# Patient Record
Sex: MALE | Race: ASIAN | NOT HISPANIC OR LATINO | URBAN - METROPOLITAN AREA
[De-identification: names, ages, dates, MRNs, and addresses within clinical notes are randomized per-mention and may not be internally consistent; named-entity substitution may affect disease eponyms.]

---

## 2018-12-20 ENCOUNTER — EMERGENCY (EMERGENCY)
Facility: HOSPITAL | Age: 23
LOS: 0 days | Discharge: HOME | End: 2018-12-20
Admitting: EMERGENCY MEDICAL TECHNICIAN, BASIC

## 2018-12-20 VITALS
DIASTOLIC BLOOD PRESSURE: 85 MMHG | HEART RATE: 80 BPM | SYSTOLIC BLOOD PRESSURE: 138 MMHG | TEMPERATURE: 97 F | OXYGEN SATURATION: 100 % | RESPIRATION RATE: 20 BRPM

## 2018-12-20 DIAGNOSIS — S92.344A NONDISPLACED FRACTURE OF FOURTH METATARSAL BONE, RIGHT FOOT, INITIAL ENCOUNTER FOR CLOSED FRACTURE: ICD-10-CM

## 2018-12-20 DIAGNOSIS — S92.354A NONDISPLACED FRACTURE OF FIFTH METATARSAL BONE, RIGHT FOOT, INITIAL ENCOUNTER FOR CLOSED FRACTURE: ICD-10-CM
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
## 2018-12-20 DIAGNOSIS — Z23 ENCOUNTER FOR IMMUNIZATION: ICD-10-CM
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
## 2018-12-20 DIAGNOSIS — M79.673 PAIN IN UNSPECIFIED FOOT: ICD-10-CM

## 2018-12-20 RX ORDER — TETANUS TOXOID, REDUCED DIPHTHERIA TOXOID AND ACELLULAR PERTUSSIS VACCINE, ADSORBED 5; 2.5; 8; 8; 2.5 [IU]/.5ML; [IU]/.5ML; UG/.5ML; UG/.5ML; UG/.5ML
0.5 SUSPENSION INTRAMUSCULAR ONCE
Qty: 0 | Refills: 0 | Status: COMPLETED | OUTPATIENT
Start: 2018-12-20 | End: 2018-12-20

## 2018-12-20 RX ADMIN — TETANUS TOXOID, REDUCED DIPHTHERIA TOXOID AND ACELLULAR PERTUSSIS VACCINE, ADSORBED 0.5 MILLILITER(S): 5; 2.5; 8; 8; 2.5 SUSPENSION INTRAMUSCULAR at 12:13

## 2018-12-20 NOTE — ED PROVIDER NOTE - NS ED ROS FT
CONSTITUTIONAL: (-) fevers, (-) chills  NECK: (-) neck pain, (-) neck stiffness  CARDIOVASCULAR: (-) chest pain, (-) edema  RESPIRATORY: (-) cough, (-) sputum, (-) shortness of breath  GI: (-) nausea, (-) vomiting, (-) abdominal pain  MSK: see HPI, (-) back pain, (-) hip pain, (-) knee pain, (-) gait difficulty  INTEGUMENTARY: (-) rashes, (-) pallor  NEURO: (-) headache, (-) dizziness, (-) lightheadedness, (-) syncope, (-) head injury, (-) LOC

## 2018-12-20 NOTE — ED PROVIDER NOTE - CARE PROVIDER_API CALL
Galen Alfonso (MD), Orthopaedic Surgery  [facility] [place] [address]  Phone: [phone]  Fax: [phone]

## 2018-12-20 NOTE — ED PROVIDER NOTE - PHYSICAL EXAMINATION
GENERAL: Well-developed, well-nourished, in no acute distress.  HEENT: Sclera clear. Conjunctiva non-injected. Mucous membranes moist.  NECK: supple, w FROM. No cervical spinous tenderness, step offs, or deformity.  CARDIOVASCULAR: Regular rate and rhythm, S1 and S2 present. No murmurs, rubs, or gallops. 2+ radial and DP pulses bilaterally. No chest wall tenderness.  RESPIRATORY: Normal effort. Clear to auscultation bilaterally without wheezes, rales, or rhonchi.  MSK: +superficial abrasions to dorsal-medial aspect of right foot. +ttp over lateral aspect of midfoot w mild overlying swelling. no obvious deformity, no ecchymosis or crepitus. FROM at the ankle, MTPs, and DIPs w/o pain. No malleolar tenderness. No calcaneal ttp. No midline thoracic or lumbar spinous tenderness, step offs, or deformity. Pelvis is stable. Knees w FROM without pain.  GI: Abdomen soft and non-distended. Nontender throughout. Negative Taveras Klein's and [place]'s.  INTEGUMENTARY: Warm, dry, no pallor or rashes. Capillary refill <2 seconds.  NEURO: A&Ox3. Speech clear.
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
## 2018-12-20 NOTE — ED PROVIDER NOTE - PROGRESS NOTE DETAILS
patient seen and evaluated by me. foot xrays and tdap ordered. patient refuses pain medications at this time.

## 2018-12-20 NOTE — ED PROVIDER NOTE - MEDICAL DECISION MAKING DETAILS
pt has 4th and 5th non-displaced metatarsal fx,  phone used for history taking as well as upon discharge, pt meño pt's uncle informed that pt must belinda p splin ton and use crutches until see's orthopedist, pt simon f/u and amendable emmanuel

## 2019-01-01 NOTE — ED PROVIDER NOTE - NS ED MD DISPO DISCHARGE
Home I will SWITCH the dose or number of times a day I take the medications listed below when I get home from the hospital:  None

## 2021-04-27 PROBLEM — Z00.00 ENCOUNTER FOR PREVENTIVE HEALTH EXAMINATION: Status: ACTIVE | Noted: 2021-04-27

## 2021-05-03 ENCOUNTER — APPOINTMENT (OUTPATIENT)
Dept: COLORECTAL SURGERY | Facility: CLINIC | Age: 26
End: 2021-05-03
Payer: COMMERCIAL

## 2021-05-03 VITALS
DIASTOLIC BLOOD PRESSURE: 81 MMHG | TEMPERATURE: 98.2 F | HEIGHT: 66.5 IN | BODY MASS INDEX: 22.23 KG/M2 | SYSTOLIC BLOOD PRESSURE: 147 MMHG | HEART RATE: 80 BPM | WEIGHT: 140 LBS

## 2021-05-03 DIAGNOSIS — Z72.3 LACK OF PHYSICAL EXERCISE: ICD-10-CM

## 2021-05-03 DIAGNOSIS — Z01.818 ENCOUNTER FOR OTHER PREPROCEDURAL EXAMINATION: ICD-10-CM

## 2021-05-03 DIAGNOSIS — Z78.9 OTHER SPECIFIED HEALTH STATUS: ICD-10-CM

## 2021-05-03 PROCEDURE — 46221 LIGATION OF HEMORRHOID(S): CPT

## 2021-05-03 PROCEDURE — 99202 OFFICE O/P NEW SF 15 MIN: CPT | Mod: 25

## 2021-05-03 PROCEDURE — 99072 ADDL SUPL MATRL&STAF TM PHE: CPT

## 2021-05-03 RX ORDER — ACETAMINOPHEN, GUAIFENESIN, PHENYLEPHRINE HCL 650; 400; 10 MG/20ML; MG/20ML; MG/20ML
LIQUID ORAL
Refills: 0 | Status: ACTIVE | COMMUNITY

## 2021-05-03 RX ORDER — LINACLOTIDE 72 UG/1
CAPSULE, GELATIN COATED ORAL
Refills: 0 | Status: ACTIVE | COMMUNITY

## 2021-05-03 RX ORDER — LORATADINE 10 MG
17 TABLET,DISINTEGRATING ORAL
Refills: 0 | Status: ACTIVE | COMMUNITY

## 2021-05-03 RX ORDER — HYDROCORTISONE 25 MG/G
CREAM TOPICAL
Refills: 0 | Status: ACTIVE | COMMUNITY

## 2021-05-03 NOTE — ASSESSMENT
[FreeTextEntry1] : I had a detailed discussion with the patient regarding optimization of bowel movements with increasing daily fiber and water intake. Both synthetic and dietary fiber recommendations were reviewed. I had strongly counseled the patient regarding the need for increasing water to help improve  bowel function.\par \par I discussed with the patient that improving  bowel function will alleviate  hemorrhoidal symptoms.\par \par Advised return in 4-6 weeks if symptoms are persistent. The potential need for an internal excisional hemorrhoidectomy was outlined.  The risks and befits of the treatment plan were reviewed and outlined with the patient. The patient understands the associated risks of the involved treatment and wishes to proceed. All questions were answered and explained.\par \par \par

## 2021-05-03 NOTE — PHYSICAL EXAM
[Excoriation] : no perianal excoriation [Normal] : was normal [None] : there was no rectal mass  [FreeTextEntry1] : A lighted anoscope was passed into the anal canal and the entire anal mucosal surface was inspected..  The findings revealed moderate/large  internal hemorrhoids. No masses or lesions were identified.\par \par The risks and benefits of rubber band ligation were discussed with the patient including but not limited to bleeding, pain, infection, and the need for future procedures. The anoscope was placed and rubber band ligation was performed of the internal hemorrhoids - leftlaterla and RPQ with good result. The patient tolerated the procedure well. Appropriate postprocedure instructions were given to the patient.\par \par \par

## 2021-05-03 NOTE — HISTORY OF PRESENT ILLNESS
[FreeTextEntry1] : 26 yo M [place]/English speaking presents w/ brother for evaluation of hemorrhoids, referred by GI Dr. ?Ing\par \par hx of chronic constipation, on Linzess once daily for the last year\par c/o worsening hemorrhoids since Summer 2020, w/ pain while sitting and BRBPR s/p RBL x 6 w/ GI w/o noticeable improvement. Last performed 1 month ago\par \par Pt continues to have BRB drip into toilet bowl and pain\par + prolapsing tissue w/ pain that reduces when he stands\par + burning after BM which is improved w/ applying topical hemorrhoid cream\par BH: twice daily, stooling improved since starting Linzess however still occasionally strains\par Also takes Miralax once daily and occasionally docusate once daily\par Admits to limited intake of vegetables and reports he has limited access to grocery stores as he works 6 days a week w/ long hours\par Drinks 2 L water daily\par \par Last colonoscopy within the last year, hemorrhoids otherwise normal as per patient\par Denies FMH colorectal CA\par Denies ASA/NSAIDs in last 7 days\par

## 2021-06-14 ENCOUNTER — APPOINTMENT (OUTPATIENT)
Dept: COLORECTAL SURGERY | Facility: CLINIC | Age: 26
End: 2021-06-14
Payer: COMMERCIAL

## 2021-06-14 VITALS
TEMPERATURE: 97.3 F | WEIGHT: 139 LBS | HEIGHT: 66.5 IN | HEART RATE: 84 BPM | DIASTOLIC BLOOD PRESSURE: 77 MMHG | SYSTOLIC BLOOD PRESSURE: 145 MMHG | BODY MASS INDEX: 22.08 KG/M2

## 2021-06-14 DIAGNOSIS — K64.8 OTHER HEMORRHOIDS: ICD-10-CM

## 2021-06-14 PROCEDURE — 46221 LIGATION OF HEMORRHOID(S): CPT

## 2021-06-14 PROCEDURE — 99072 ADDL SUPL MATRL&STAF TM PHE: CPT

## 2021-06-14 PROCEDURE — 99213 OFFICE O/P EST LOW 20 MIN: CPT | Mod: 25

## 2021-06-14 NOTE — REASON FOR VISIT
[Follow-Up: _____] : a [unfilled] follow-up visit [[employer] ] : provided by [employer]   [FreeTextEntry1] : [id number] [FreeTextEntry3] : [place]  [TWNoteComboBox1] : Other

## 2021-06-14 NOTE — PHYSICAL EXAM
[Excoriation] : no perianal excoriation [Normal] : was normal [None] : there was no rectal mass  [FreeTextEntry1] : Medical assistant was present for the entire exam.\par \par Anoscopy was performed for evaluation of the patients rectal bleeding  history .\par The risks, benefits and alternatives were reviewed.\par \par A lighted anoscope was passed into the anal canal and the entire anal mucosal surface was inspected..  \par The findings revealed moderate internal hemorrhoids.\par  No masses or lesions were identified.\par \par \par The risks and benefits of rubber band ligation were discussed with the patient including but not limited to bleeding, pain, infection, and the need for future procedures. The anoscope was placed and rubber band ligation was performed of the internal hemorrhoids - RAq/ left lateral with good result. The patient tolerated the procedure well. Appropriate postprocedure instructions were given to the patient.\par

## 2023-05-17 NOTE — REASON FOR VISIT
[Initial Evaluation] : an initial evaluation normal external genitalia/no hernia/no discharge/no mass/no tenderness/no ulcer/normal/no penile lesion/no palpable testicular mass/no scrotal mass

## 2023-09-29 NOTE — HISTORY OF PRESENT ILLNESS
Chief Complaint   Patient presents with   • Suicidal     Pt presents to the ED with complaints of feeling suicidal and depressed. Pt denies any alcohol or drug use.    [FreeTextEntry1] : 26 y/o Arabic and English speaking M presents for f/u hemorrhoids\par PSH of RBL x4-6 with outside provider\par H/o chronic constipation, on Linzess but not taking daily\par \par Last seen 5/3/21, moderate/large internal hemorrhoids noted on exam. s/p RBL to LL and RPQ with moderate improvement in symptoms\par \par C/o itching, pain and BRBPR on the TP 3-4 times in the past week\par Reports prolapsing tissue with most BM's, does not manually reduce\par BH:daily\par Reports typically only able to expel a small amount at time\par Admits to limited dietary fiber intake some days 2/2 work schedule. Currently drinking 2-3 L water daily \par Last colonoscopy within the last year, hemorrhoids otherwise normal as per patient\par Denies FMH colorectal CA\par Denies ASA/NSAIDs in last 7 days